# Patient Record
Sex: MALE | Race: WHITE | NOT HISPANIC OR LATINO | Employment: OTHER | URBAN - NONMETROPOLITAN AREA
[De-identification: names, ages, dates, MRNs, and addresses within clinical notes are randomized per-mention and may not be internally consistent; named-entity substitution may affect disease eponyms.]

---

## 2018-03-19 ENCOUNTER — OFFICE VISIT (OUTPATIENT)
Dept: CARDIOLOGY | Facility: CLINIC | Age: 78
End: 2018-03-19

## 2018-03-19 VITALS
DIASTOLIC BLOOD PRESSURE: 78 MMHG | HEIGHT: 68 IN | OXYGEN SATURATION: 96 % | BODY MASS INDEX: 27.04 KG/M2 | HEART RATE: 67 BPM | SYSTOLIC BLOOD PRESSURE: 166 MMHG | WEIGHT: 178.4 LBS

## 2018-03-19 DIAGNOSIS — Z01.818 PRE-OPERATIVE CLEARANCE: ICD-10-CM

## 2018-03-19 DIAGNOSIS — R00.2 PALPITATION: ICD-10-CM

## 2018-03-19 DIAGNOSIS — I10 ESSENTIAL HYPERTENSION: Primary | ICD-10-CM

## 2018-03-19 DIAGNOSIS — R06.02 SHORTNESS OF BREATH: ICD-10-CM

## 2018-03-19 DIAGNOSIS — R94.31 ABNORMAL EKG: ICD-10-CM

## 2018-03-19 PROCEDURE — 99204 OFFICE O/P NEW MOD 45 MIN: CPT | Performed by: PHYSICIAN ASSISTANT

## 2018-03-19 PROCEDURE — 93000 ELECTROCARDIOGRAM COMPLETE: CPT | Performed by: PHYSICIAN ASSISTANT

## 2018-03-19 RX ORDER — PHENOL 1.4 %
AEROSOL, SPRAY (ML) MUCOUS MEMBRANE NIGHTLY
COMMUNITY

## 2018-03-19 RX ORDER — SERTRALINE HYDROCHLORIDE 100 MG/1
TABLET, FILM COATED ORAL DAILY
COMMUNITY
Start: 2018-02-17

## 2018-03-19 RX ORDER — LORAZEPAM 1 MG/1
TABLET ORAL 3 TIMES DAILY
COMMUNITY
Start: 2018-02-05

## 2018-03-19 NOTE — PROGRESS NOTES
Xander Walton is a 77 y.o. male     Chief Complaint   Patient presents with   • Establish Care     Cardiac clearance for umbilical hernia repair per Dr. Mendoza       HPI    Problem list  1.  Abnormal EKG  2.  Legally blind    Patient is a 77-year-old male that presents for evaluation referred by Dr. Negrete.  Patient had EKG performed at Dr. Negrete office which demonstrated ST changes in the lateral leads.  Apparently, there is surgery pending for umbilical hernia repair by Dr. Mendoza.    He doesn't describe chest pain.  However, he does have shortness of breath but apparently has chronic lung disease.  No PND orthopnea.  He doesn't palpitate or have dysrhythmic symptoms and otherwise is doing well          Current Outpatient Prescriptions   Medication Sig Dispense Refill   • aspirin 81 MG tablet Take 81 mg by mouth. To be starting     • LORazepam (ATIVAN) 1 MG tablet 3 (Three) Times a Day.     • Melatonin 10 MG tablet Take  by mouth Every Night.     • sertraline (ZOLOFT) 100 MG tablet Daily.       No current facility-administered medications for this visit.        Review of patient's allergies indicates no known allergies.    Past Medical History:   Diagnosis Date   • Aneurysm     repair, ? location   • Blindness    • COPD (chronic obstructive pulmonary disease)    • Hearing loss    • Hyperlipidemia    • Hypertension    • Kidney cysts    • Umbilical hernia        Social History     Social History   • Marital status:      Spouse name: N/A   • Number of children: N/A   • Years of education: N/A     Occupational History   • Not on file.     Social History Main Topics   • Smoking status: Former Smoker     Types: Cigarettes, Cigars   • Smokeless tobacco: Never Used      Comment: smoked for approx. 50+   • Alcohol use Yes      Comment: occas. zuhair   • Drug use: No   • Sexual activity: Not on file     Other Topics Concern   • Not on file     Social History Narrative   • No narrative on file  "        History reviewed. No pertinent family history.    Review of Systems   Constitutional: Positive for fatigue.   HENT: Positive for hearing loss (left hearing aide).    Eyes: Positive for visual disturbance (glasses, complete blindness).   Respiratory: Positive for shortness of breath.    Cardiovascular: Positive for palpitations (rare). Negative for leg swelling.   Gastrointestinal: Positive for constipation.        HX umbilical hernia   Endocrine: Negative.    Genitourinary: Negative.    Musculoskeletal: Positive for arthralgias and myalgias.   Skin: Negative.    Allergic/Immunologic: Positive for environmental allergies.   Neurological: Positive for dizziness (occas.).   Hematological: Bruises/bleeds easily (bruise).   Psychiatric/Behavioral: Negative.        Objective   Vitals:    03/19/18 1024   BP: 166/78   BP Location: Left arm   Patient Position: Sitting   Pulse: 67   SpO2: 96%   Weight: 80.9 kg (178 lb 6.4 oz)   Height: 172.7 cm (68\")      /78 (BP Location: Left arm, Patient Position: Sitting)   Pulse 67   Ht 172.7 cm (68\")   Wt 80.9 kg (178 lb 6.4 oz)   SpO2 96%   BMI 27.13 kg/m²     Lab Results (most recent)     None          Physical Exam   Constitutional: He is oriented to person, place, and time. He appears well-developed and well-nourished. No distress.   HENT:   Head: Normocephalic and atraumatic.   Eyes: EOM are normal. Pupils are equal, round, and reactive to light.   Neck: No JVD present.   Cardiovascular: Normal rate, regular rhythm, normal heart sounds and intact distal pulses.  Exam reveals no gallop and no friction rub.    No murmur heard.  Pulmonary/Chest: Effort normal and breath sounds normal. No respiratory distress. He has no wheezes. He has no rales. He exhibits no tenderness.   Musculoskeletal: Normal range of motion. He exhibits no edema.   Neurological: He is alert and oriented to person, place, and time. No cranial nerve deficit.   Skin: Skin is warm and dry. No rash " noted. No erythema. No pallor.   Psychiatric: He has a normal mood and affect. His behavior is normal.   Nursing note and vitals reviewed.      Procedure     ECG 12 Lead  Date/Time: 3/19/2018 10:41 AM  Performed by: FRANCO SMITH  Authorized by: FRANCO SMITH   Comments: EKG demonstrates sinus rhythm at 58 bpm, normal axis, no acute ST changes                 Assessment/Plan     Problems Addressed this Visit        Cardiovascular and Mediastinum    Palpitation    Relevant Orders    ECG 12 Lead    Stress Test With Myocardial Perfusion One Day    Adult Transthoracic Echo Complete W/ Cont if Necessary Per Protocol    Essential hypertension - Primary    Relevant Orders    ECG 12 Lead    Stress Test With Myocardial Perfusion One Day    Adult Transthoracic Echo Complete W/ Cont if Necessary Per Protocol    Abnormal EKG    Relevant Orders    Stress Test With Myocardial Perfusion One Day    Adult Transthoracic Echo Complete W/ Cont if Necessary Per Protocol       Respiratory    Shortness of breath    Relevant Orders    Stress Test With Myocardial Perfusion One Day    Adult Transthoracic Echo Complete W/ Cont if Necessary Per Protocol       Other    Pre-operative clearance    Relevant Orders    ECG 12 Lead    Stress Test With Myocardial Perfusion One Day    Adult Transthoracic Echo Complete W/ Cont if Necessary Per Protocol      Other Visit Diagnoses    None.             Recommendation  1.  Patient has abnormal EKG at primary office showing ST changes in the lateral leads.  With shortness of breath impending surgery, we'll like to rule out ischemia as a contributing factor.  Will schedule Lexiscan stress test because of patient's comorbidities including him being blind.  We will get echocardiogram to evaluate LV function.  We'll see him back for follow-up on above testing.  Follow-up primary as scheduled                  Discussed the patient's BMI with him. BMI is within normal parameters. No follow-up required.          Electronically signed by:

## 2018-04-05 ENCOUNTER — APPOINTMENT (OUTPATIENT)
Dept: CARDIOLOGY | Facility: HOSPITAL | Age: 78
End: 2018-04-05

## 2018-04-17 ENCOUNTER — HOSPITAL ENCOUNTER (OUTPATIENT)
Dept: CARDIOLOGY | Facility: HOSPITAL | Age: 78
Discharge: HOME | End: 2018-04-17

## 2018-04-17 ENCOUNTER — OUTSIDE FACILITY SERVICE (OUTPATIENT)
Dept: CARDIOLOGY | Facility: CLINIC | Age: 78
End: 2018-04-17

## 2018-04-17 LAB
MAXIMAL PREDICTED HEART RATE: 144 BPM
MAXIMAL PREDICTED HEART RATE: 144 BPM
STRESS TARGET HR: 122 BPM
STRESS TARGET HR: 122 BPM

## 2018-04-17 PROCEDURE — 78452 HT MUSCLE IMAGE SPECT MULT: CPT | Performed by: INTERNAL MEDICINE

## 2018-04-17 PROCEDURE — 93306 TTE W/DOPPLER COMPLETE: CPT

## 2018-04-17 PROCEDURE — 93017 CV STRESS TEST TRACING ONLY: CPT

## 2018-04-17 PROCEDURE — 0 TECHNETIUM SESTAMIBI: Performed by: INTERNAL MEDICINE

## 2018-04-17 PROCEDURE — 78452 HT MUSCLE IMAGE SPECT MULT: CPT

## 2018-04-17 PROCEDURE — 93018 CV STRESS TEST I&R ONLY: CPT | Performed by: INTERNAL MEDICINE

## 2018-04-17 PROCEDURE — 25010000002 REGADENOSON 0.4 MG/5ML SOLUTION: Performed by: INTERNAL MEDICINE

## 2018-04-17 PROCEDURE — 93306 TTE W/DOPPLER COMPLETE: CPT | Performed by: INTERNAL MEDICINE

## 2018-04-17 PROCEDURE — A9500 TC99M SESTAMIBI: HCPCS | Performed by: INTERNAL MEDICINE

## 2018-04-17 RX ADMIN — TECHNETIUM TC 99M SESTAMIBI 1 DOSE: 1 INJECTION INTRAVENOUS at 13:00

## 2018-04-17 RX ADMIN — REGADENOSON 0.4 MG: 0.08 INJECTION, SOLUTION INTRAVENOUS at 13:00

## 2018-04-24 ENCOUNTER — DOCUMENTATION (OUTPATIENT)
Dept: CARDIOLOGY | Facility: CLINIC | Age: 78
End: 2018-04-24

## 2018-04-24 NOTE — PROGRESS NOTES
PATIENT'S WIFE AWARE OF ECHO AND STRESS TEST RESULTS, AND TO KEEP 5-23-18 APPT.   JEFFREY GUERRERON